# Patient Record
Sex: FEMALE | Race: BLACK OR AFRICAN AMERICAN | NOT HISPANIC OR LATINO | URBAN - METROPOLITAN AREA
[De-identification: names, ages, dates, MRNs, and addresses within clinical notes are randomized per-mention and may not be internally consistent; named-entity substitution may affect disease eponyms.]

---

## 2017-12-07 ENCOUNTER — EMERGENCY (EMERGENCY)
Facility: HOSPITAL | Age: 52
LOS: 0 days | Discharge: HOME | End: 2017-12-07
Attending: EMERGENCY MEDICINE
Payer: MEDICAID

## 2017-12-07 VITALS
WEIGHT: 149.91 LBS | SYSTOLIC BLOOD PRESSURE: 152 MMHG | RESPIRATION RATE: 19 BRPM | HEART RATE: 59 BPM | HEIGHT: 60 IN | TEMPERATURE: 98 F | DIASTOLIC BLOOD PRESSURE: 82 MMHG

## 2017-12-07 VITALS
OXYGEN SATURATION: 98 % | HEART RATE: 67 BPM | TEMPERATURE: 98 F | RESPIRATION RATE: 18 BRPM | SYSTOLIC BLOOD PRESSURE: 148 MMHG | DIASTOLIC BLOOD PRESSURE: 79 MMHG

## 2017-12-07 LAB
ALBUMIN SERPL ELPH-MCNC: 3.5 G/DL — SIGNIFICANT CHANGE UP (ref 3.3–5)
ALP SERPL-CCNC: 116 U/L — SIGNIFICANT CHANGE UP (ref 40–120)
ALT FLD-CCNC: 35 U/L — SIGNIFICANT CHANGE UP (ref 12–78)
ANION GAP SERPL CALC-SCNC: 8 MMOL/L — SIGNIFICANT CHANGE UP (ref 5–17)
APPEARANCE UR: CLEAR — SIGNIFICANT CHANGE UP
APTT BLD: 32 SEC — SIGNIFICANT CHANGE UP (ref 27.5–37.4)
AST SERPL-CCNC: 18 U/L — SIGNIFICANT CHANGE UP (ref 15–37)
BACTERIA # UR AUTO: ABNORMAL
BASOPHILS # BLD AUTO: 0.1 K/UL — SIGNIFICANT CHANGE UP (ref 0–0.2)
BASOPHILS NFR BLD AUTO: 1.1 % — SIGNIFICANT CHANGE UP (ref 0–2)
BILIRUB SERPL-MCNC: 0.2 MG/DL — SIGNIFICANT CHANGE UP (ref 0.2–1.2)
BILIRUB UR-MCNC: NEGATIVE — SIGNIFICANT CHANGE UP
BUN SERPL-MCNC: 19 MG/DL — SIGNIFICANT CHANGE UP (ref 7–23)
CALCIUM SERPL-MCNC: 8.6 MG/DL — SIGNIFICANT CHANGE UP (ref 8.5–10.1)
CHLORIDE SERPL-SCNC: 108 MMOL/L — SIGNIFICANT CHANGE UP (ref 96–108)
CK MB BLD-MCNC: 2.9 % — SIGNIFICANT CHANGE UP (ref 0–3.5)
CK MB CFR SERPL CALC: 4.9 NG/ML — HIGH (ref 0.5–3.6)
CK SERPL-CCNC: 171 U/L — SIGNIFICANT CHANGE UP (ref 26–192)
CO2 SERPL-SCNC: 27 MMOL/L — SIGNIFICANT CHANGE UP (ref 22–31)
COD CRY URNS QL: ABNORMAL
COLOR SPEC: YELLOW — SIGNIFICANT CHANGE UP
CREAT SERPL-MCNC: 1.04 MG/DL — SIGNIFICANT CHANGE UP (ref 0.5–1.3)
D DIMER BLD IA.RAPID-MCNC: 166 NG/ML DDU — SIGNIFICANT CHANGE UP
DIFF PNL FLD: ABNORMAL
EOSINOPHIL # BLD AUTO: 0.1 K/UL — SIGNIFICANT CHANGE UP (ref 0–0.5)
EOSINOPHIL NFR BLD AUTO: 1.4 % — SIGNIFICANT CHANGE UP (ref 0–6)
EPI CELLS # UR: SIGNIFICANT CHANGE UP
GLUCOSE SERPL-MCNC: 87 MG/DL — SIGNIFICANT CHANGE UP (ref 70–99)
GLUCOSE UR QL: NEGATIVE MG/DL — SIGNIFICANT CHANGE UP
HCT VFR BLD CALC: 42.8 % — SIGNIFICANT CHANGE UP (ref 34.5–45)
HGB BLD-MCNC: 13.8 G/DL — SIGNIFICANT CHANGE UP (ref 11.5–15.5)
INR BLD: 1.03 RATIO — SIGNIFICANT CHANGE UP (ref 0.88–1.16)
KETONES UR-MCNC: NEGATIVE — SIGNIFICANT CHANGE UP
LEUKOCYTE ESTERASE UR-ACNC: ABNORMAL
LYMPHOCYTES # BLD AUTO: 4.7 K/UL — HIGH (ref 1–3.3)
LYMPHOCYTES # BLD AUTO: 43.8 % — SIGNIFICANT CHANGE UP (ref 13–44)
MCHC RBC-ENTMCNC: 30.7 PG — SIGNIFICANT CHANGE UP (ref 27–34)
MCHC RBC-ENTMCNC: 32.2 GM/DL — SIGNIFICANT CHANGE UP (ref 32–36)
MCV RBC AUTO: 95.5 FL — SIGNIFICANT CHANGE UP (ref 80–100)
MONOCYTES # BLD AUTO: 0.5 K/UL — SIGNIFICANT CHANGE UP (ref 0–0.9)
MONOCYTES NFR BLD AUTO: 5.1 % — SIGNIFICANT CHANGE UP (ref 2–14)
NEUTROPHILS # BLD AUTO: 5.2 K/UL — SIGNIFICANT CHANGE UP (ref 1.8–7.4)
NEUTROPHILS NFR BLD AUTO: 48.6 % — SIGNIFICANT CHANGE UP (ref 43–77)
NITRITE UR-MCNC: NEGATIVE — SIGNIFICANT CHANGE UP
PH UR: 6 — SIGNIFICANT CHANGE UP (ref 5–8)
PLATELET # BLD AUTO: 282 K/UL — SIGNIFICANT CHANGE UP (ref 150–400)
POTASSIUM SERPL-MCNC: 3.5 MMOL/L — SIGNIFICANT CHANGE UP (ref 3.5–5.3)
POTASSIUM SERPL-SCNC: 3.5 MMOL/L — SIGNIFICANT CHANGE UP (ref 3.5–5.3)
PROT SERPL-MCNC: 7.4 GM/DL — SIGNIFICANT CHANGE UP (ref 6–8.3)
PROT UR-MCNC: NEGATIVE MG/DL — SIGNIFICANT CHANGE UP
PROTHROM AB SERPL-ACNC: 11.2 SEC — SIGNIFICANT CHANGE UP (ref 9.8–12.7)
RBC # BLD: 4.49 M/UL — SIGNIFICANT CHANGE UP (ref 3.8–5.2)
RBC # FLD: 11.8 % — SIGNIFICANT CHANGE UP (ref 11–15)
RBC CASTS # UR COMP ASSIST: ABNORMAL /HPF (ref 0–4)
SODIUM SERPL-SCNC: 143 MMOL/L — SIGNIFICANT CHANGE UP (ref 135–145)
SP GR SPEC: 1.02 — SIGNIFICANT CHANGE UP (ref 1.01–1.02)
TROPONIN I SERPL-MCNC: <.015 NG/ML — SIGNIFICANT CHANGE UP (ref 0.01–0.04)
UROBILINOGEN FLD QL: 1 MG/DL
WBC # BLD: 10.6 K/UL — HIGH (ref 3.8–10.5)
WBC # FLD AUTO: 10.6 K/UL — HIGH (ref 3.8–10.5)
WBC UR QL: ABNORMAL

## 2017-12-07 PROCEDURE — 99285 EMERGENCY DEPT VISIT HI MDM: CPT

## 2017-12-07 PROCEDURE — 71020: CPT | Mod: 26

## 2017-12-07 PROCEDURE — 93010 ELECTROCARDIOGRAM REPORT: CPT

## 2017-12-07 PROCEDURE — 71275 CT ANGIOGRAPHY CHEST: CPT | Mod: 26

## 2017-12-07 RX ORDER — SODIUM CHLORIDE 9 MG/ML
3 INJECTION INTRAMUSCULAR; INTRAVENOUS; SUBCUTANEOUS ONCE
Qty: 0 | Refills: 0 | Status: COMPLETED | OUTPATIENT
Start: 2017-12-07 | End: 2017-12-07

## 2017-12-07 RX ORDER — NITROFURANTOIN MACROCRYSTAL 50 MG
100 CAPSULE ORAL ONCE
Qty: 0 | Refills: 0 | Status: COMPLETED | OUTPATIENT
Start: 2017-12-07 | End: 2017-12-07

## 2017-12-07 RX ORDER — FAMOTIDINE 10 MG/ML
20 INJECTION INTRAVENOUS ONCE
Qty: 0 | Refills: 0 | Status: COMPLETED | OUTPATIENT
Start: 2017-12-07 | End: 2017-12-07

## 2017-12-07 RX ORDER — IPRATROPIUM/ALBUTEROL SULFATE 18-103MCG
3 AEROSOL WITH ADAPTER (GRAM) INHALATION ONCE
Qty: 0 | Refills: 0 | Status: COMPLETED | OUTPATIENT
Start: 2017-12-07 | End: 2017-12-07

## 2017-12-07 RX ORDER — NITROFURANTOIN MACROCRYSTAL 50 MG
1 CAPSULE ORAL
Qty: 20 | Refills: 0 | OUTPATIENT
Start: 2017-12-07 | End: 2017-12-17

## 2017-12-07 RX ORDER — SODIUM CHLORIDE 9 MG/ML
2000 INJECTION INTRAMUSCULAR; INTRAVENOUS; SUBCUTANEOUS ONCE
Qty: 0 | Refills: 0 | Status: COMPLETED | OUTPATIENT
Start: 2017-12-07 | End: 2017-12-07

## 2017-12-07 RX ADMIN — SODIUM CHLORIDE 2000 MILLILITER(S): 9 INJECTION INTRAMUSCULAR; INTRAVENOUS; SUBCUTANEOUS at 19:07

## 2017-12-07 RX ADMIN — SODIUM CHLORIDE 3 MILLILITER(S): 9 INJECTION INTRAMUSCULAR; INTRAVENOUS; SUBCUTANEOUS at 18:51

## 2017-12-07 RX ADMIN — Medication 3 MILLILITER(S): at 19:06

## 2017-12-07 RX ADMIN — Medication 100 MILLIGRAM(S): at 23:03

## 2017-12-07 RX ADMIN — FAMOTIDINE 20 MILLIGRAM(S): 10 INJECTION INTRAVENOUS at 19:06

## 2017-12-07 NOTE — ED PROVIDER NOTE - PHYSICAL EXAMINATION
Gen: Alert, Well appearing. NAD    Head: NC, AT, PERRL, EOMI, normal lids/conjunctiva   ENT: Bilateral TM WNL, normal hearing, patent oropharynx without erythema/exudate, uvula midline. ++ nasal congestion.  Neck: supple, no tenderness/meningismus/JVD   Pulm: Bilateral clear BS, normal resp effort, no wheeze/stridor/retractions  CV: RRR, no M/R/G, +dist pulses   Abd: soft, NT/ND, +BS, no guarding/rebound tenderness  Mskel: no edema/erythema/cyanosis   Skin: no rash   Neuro: AAOx3, no sensory/motor deficits, CN 2-12 intact

## 2017-12-07 NOTE — ED ADULT NURSE NOTE - CHIEF COMPLAINT QUOTE
Pt c/o of chest tightness sin monday. Went [facility].  Pt stated that they did not work her up.Chest pain persist until now. D/c on Prednisone   and since has been taken medication, dizziness and difficulty urinating. Last time void was yesterday afternoon.  denies any fever ou coughing

## 2017-12-07 NOTE — ED ADULT NURSE NOTE - PMH
Acute pyelonephritis    Amenorrhea  started 15 years ago  Asthma    History of nephrolithiasis    Pituitary abnormality

## 2017-12-07 NOTE — ED PROVIDER NOTE - MEDICAL DECISION MAKING DETAILS
Pending UA, CT. Patient signed out to incoming physician.  All decisions regarding the progression of care will be made at their discretion.

## 2017-12-07 NOTE — ED PROVIDER NOTE - OBJECTIVE STATEMENT
53yo female with pmh asthma presents with 3 days of nasal congestion, epigastric/SS chest pain, and sob. Pt seen at [facility], given prednisone and dc with nasal congestion.  pt denies cough, fever. Denies history of cardiac dz. nonsmoker. no drug use. no famh of cardiac. Denies recent immobilization, surgery, long trips or plane rides, hormones/OCP, leg pain or swelling or family or personal history of blood clotting disorder. Pt reports menopausal x 20 years. Pt reports since she started prednisone, has gotten dizzy and few episodes of diarrhea and states difficulty with uop. denies abd pain. denies dysuria.     ROS: No fever/chills. No photophobia/eye pain/changes in vision, No ear pain/sore throat/dysphagia, + chest pain, no palpitations. +SOB, no cough/stridor. No abdominal pain, N/V, +D, no black/bloody bm. No dysuria/frequency/discharge, No headache. + Dizziness.  No rash.  No numbness/tingling/weakness.

## 2017-12-07 NOTE — ED ADULT NURSE NOTE - OBJECTIVE STATEMENT
patient c/o of chest pain and difficulty breathing started 2 days ago , patient c/o of diarrhea , unable to urinate and dizziness started yesterday, ashleigh stated she is taking Prednisone

## 2017-12-07 NOTE — ED ADULT TRIAGE NOTE - CHIEF COMPLAINT QUOTE
Pt c/o of chest tightness sin monday. Went [facility].  Pt stated that they did not work her up.Chest pain persist until now. D/c on Prednisone   and since has been taken medication, dizziness and difficulty urinating. Last time void was yesterday afternoon.  denies any fever ou coughing

## 2017-12-07 NOTE — ED PROVIDER NOTE - PROGRESS NOTE DETAILS
Pt endorsed by Dr. Shoemaker present for eval of SOB.  Pending CTA. CTA neg for PE, no PNA.  +UTI.  Discussed results and outcome of testing with the patient, given copy as well.  Patient advised to please follow up with their primary care doctor within the next 24 hours and return to the Emergency Department for worsening symptoms or any other concerns.  Patient advised that their doctor may call  to follow up on the specific results of the tests performed today in the emergency department.

## 2017-12-08 DIAGNOSIS — R07.9 CHEST PAIN, UNSPECIFIED: ICD-10-CM

## 2017-12-08 DIAGNOSIS — Z88.8 ALLERGY STATUS TO OTHER DRUGS, MEDICAMENTS AND BIOLOGICAL SUBSTANCES STATUS: ICD-10-CM

## 2017-12-08 DIAGNOSIS — R10.13 EPIGASTRIC PAIN: ICD-10-CM

## 2017-12-08 DIAGNOSIS — R09.81 NASAL CONGESTION: ICD-10-CM

## 2017-12-08 DIAGNOSIS — N10 ACUTE PYELONEPHRITIS: ICD-10-CM

## 2017-12-08 DIAGNOSIS — J45.909 UNSPECIFIED ASTHMA, UNCOMPLICATED: ICD-10-CM

## 2017-12-08 DIAGNOSIS — N91.2 AMENORRHEA, UNSPECIFIED: ICD-10-CM

## 2017-12-08 DIAGNOSIS — N39.0 URINARY TRACT INFECTION, SITE NOT SPECIFIED: ICD-10-CM

## 2017-12-08 LAB
CULTURE RESULTS: SIGNIFICANT CHANGE UP
SPECIMEN SOURCE: SIGNIFICANT CHANGE UP

## 2018-01-22 ENCOUNTER — EMERGENCY (EMERGENCY)
Facility: HOSPITAL | Age: 53
LOS: 0 days | Discharge: HOME | End: 2018-01-22
Attending: EMERGENCY MEDICINE
Payer: MEDICAID

## 2018-01-22 VITALS
RESPIRATION RATE: 21 BRPM | SYSTOLIC BLOOD PRESSURE: 127 MMHG | WEIGHT: 149.91 LBS | OXYGEN SATURATION: 97 % | HEIGHT: 60 IN | TEMPERATURE: 98 F | HEART RATE: 82 BPM | DIASTOLIC BLOOD PRESSURE: 49 MMHG

## 2018-01-22 VITALS
SYSTOLIC BLOOD PRESSURE: 123 MMHG | OXYGEN SATURATION: 98 % | TEMPERATURE: 99 F | DIASTOLIC BLOOD PRESSURE: 73 MMHG | HEART RATE: 77 BPM | RESPIRATION RATE: 16 BRPM

## 2018-01-22 PROCEDURE — 99284 EMERGENCY DEPT VISIT MOD MDM: CPT

## 2018-01-22 RX ORDER — DIPHENHYDRAMINE HCL 50 MG
50 CAPSULE ORAL ONCE
Qty: 0 | Refills: 0 | Status: COMPLETED | OUTPATIENT
Start: 2018-01-22 | End: 2018-01-22

## 2018-01-22 RX ORDER — DIPHENHYDRAMINE HCL 50 MG
1 CAPSULE ORAL
Qty: 15 | Refills: 0
Start: 2018-01-22 | End: 2018-01-26

## 2018-01-22 RX ORDER — EPINEPHRINE 0.3 MG/.3ML
0.3 INJECTION INTRAMUSCULAR; SUBCUTANEOUS
Qty: 1 | Refills: 0
Start: 2018-01-22 | End: 2018-01-22

## 2018-01-22 RX ORDER — FAMOTIDINE 10 MG/ML
20 INJECTION INTRAVENOUS ONCE
Qty: 0 | Refills: 0 | Status: COMPLETED | OUTPATIENT
Start: 2018-01-22 | End: 2018-01-22

## 2018-01-22 RX ORDER — FAMOTIDINE 10 MG/ML
1 INJECTION INTRAVENOUS
Qty: 10 | Refills: 0
Start: 2018-01-22 | End: 2018-01-26

## 2018-01-22 RX ADMIN — Medication 50 MILLIGRAM(S): at 06:48

## 2018-01-22 RX ADMIN — FAMOTIDINE 20 MILLIGRAM(S): 10 INJECTION INTRAVENOUS at 06:49

## 2018-01-22 RX ADMIN — Medication 125 MILLIGRAM(S): at 06:48

## 2018-01-22 NOTE — ED ADULT NURSE NOTE - PSH
Ankle fracture  S/p ORIF 1n   Ankle fracture, left  S/p ORIF in 2011   delivery delivered  1984  Renal calculus, right  right renal calculi removal ,  S/P abdominoplasty  .

## 2018-01-22 NOTE — ED PROVIDER NOTE - OBJECTIVE STATEMENT
Pt is a 51 yo lady with a pmhx of asthma who presents to the ED with an allergic reaction. Has had hives on her elbow and neck for the past week, but last night it was worse. Has been taking benadryl intermittently, which controls the hives, but they return. No throat swelling. No hx of allergic reactions in the past. 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 No new medications, does not know what may be causing the hives. This reaction started again at 4 AM, worse on thighs now.

## 2018-01-22 NOTE — ED ADULT NURSE NOTE - OBJECTIVE STATEMENT
Pt c/o generalized pruritis & rash for a few days increasing in itchiness and red papules noticed last night. Pt denies trying any new food/detergent. Reports recently having a hair weave place this week which she took out yesterday due to increase pruritis. Denies trouble breathing. Denies trouble swallowing. A&Ox4. No distress noted. Able to speak in full sentences. Generalized red papules noted

## 2018-01-22 NOTE — ED ADULT NURSE NOTE - CAS EDN DISCHARGE ASSESSMENT
Alert and oriented to person, place and time/Dressing clean and dry/Awake/No adverse reaction to first time med in ED/Symptoms improved

## 2018-01-22 NOTE — ED PROVIDER NOTE - PROGRESS NOTE DETAILS
Pt. reports feeling better after meds, rash fading, pt. denies knowing what caused reaction  pt. agrees to f/u with primary care outpt.  pt. understands to return to ED if symptoms worsen; will d/c with meds

## 2018-01-23 DIAGNOSIS — L23.9 ALLERGIC CONTACT DERMATITIS, UNSPECIFIED CAUSE: ICD-10-CM

## 2018-01-23 DIAGNOSIS — L50.9 URTICARIA, UNSPECIFIED: ICD-10-CM

## 2018-01-23 DIAGNOSIS — Z88.1 ALLERGY STATUS TO OTHER ANTIBIOTIC AGENTS STATUS: ICD-10-CM

## 2018-01-23 DIAGNOSIS — J45.909 UNSPECIFIED ASTHMA, UNCOMPLICATED: ICD-10-CM

## 2018-01-30 ENCOUNTER — EMERGENCY (EMERGENCY)
Facility: HOSPITAL | Age: 53
LOS: 0 days | Discharge: HOME | End: 2018-01-30
Attending: EMERGENCY MEDICINE
Payer: MEDICAID

## 2018-01-30 VITALS
HEART RATE: 85 BPM | TEMPERATURE: 99 F | RESPIRATION RATE: 20 BRPM | DIASTOLIC BLOOD PRESSURE: 79 MMHG | SYSTOLIC BLOOD PRESSURE: 151 MMHG | OXYGEN SATURATION: 99 % | HEIGHT: 68 IN | WEIGHT: 149.91 LBS

## 2018-01-30 PROCEDURE — 99283 EMERGENCY DEPT VISIT LOW MDM: CPT

## 2018-01-30 RX ADMIN — Medication 30 MILLILITER(S): at 19:11

## 2018-01-30 RX ADMIN — Medication 60 MILLIGRAM(S): at 19:10

## 2018-01-30 NOTE — ED ADULT NURSE NOTE - PMH
Acute pyelonephritis    Amenorrhea  started 15 years ago  Asthma    Gastritis    History of nephrolithiasis    Pituitary abnormality

## 2018-01-30 NOTE — ED PROVIDER NOTE - OBJECTIVE STATEMENT
Patient has had hives for several days despite taking corticosteroids and antihistamines; patient admits symptoms started after using new  for clothes

## 2018-01-30 NOTE — ED ADULT TRIAGE NOTE - CHIEF COMPLAINT QUOTE
pt states, " I was here for hives last week, my prednisone is finished and I am breaking out in hives now "

## 2018-01-30 NOTE — ED PROVIDER NOTE - MEDICAL DECISION MAKING DETAILS
patient advised to not use the  and see if there is any improvement; patient has allergist appoint in next several days

## 2018-01-30 NOTE — ED ADULT NURSE NOTE - OBJECTIVE STATEMENT
received ft c/o generalized hives noted x 1 week to arms, hands, and body stays slight throat discomfort earlier in week seen at [facility] on monday for chest tightness, asthma exacerbation associated with hives with epi pen prednisone and claritin rx'd no swelling noted to face, lips at present lungs clear b/l nasal congestion noted denies difficulty breathing at present states hx asthma, used inhaler earlier this morning with relief states using laundromat service to wash towels and sheets

## 2018-01-31 DIAGNOSIS — E23.7 DISORDER OF PITUITARY GLAND, UNSPECIFIED: ICD-10-CM

## 2018-01-31 DIAGNOSIS — Z79.899 OTHER LONG TERM (CURRENT) DRUG THERAPY: ICD-10-CM

## 2018-01-31 DIAGNOSIS — J45.909 UNSPECIFIED ASTHMA, UNCOMPLICATED: ICD-10-CM

## 2018-01-31 DIAGNOSIS — Z79.2 LONG TERM (CURRENT) USE OF ANTIBIOTICS: ICD-10-CM

## 2018-01-31 DIAGNOSIS — L50.9 URTICARIA, UNSPECIFIED: ICD-10-CM

## 2018-01-31 DIAGNOSIS — R21 RASH AND OTHER NONSPECIFIC SKIN ERUPTION: ICD-10-CM

## 2018-03-01 NOTE — ED ADULT TRIAGE NOTE - NSWEIGHTCALCTOOLDRUG_GEN_A_CORE
Right UE Passive ROM was WNL (within normal limits)/Right UE Active ROM was WNL (within normal limits)  used

## 2018-05-19 ENCOUNTER — EMERGENCY (EMERGENCY)
Facility: HOSPITAL | Age: 53
LOS: 0 days | Discharge: HOME | End: 2018-05-20
Attending: EMERGENCY MEDICINE
Payer: MEDICAID

## 2018-05-19 VITALS
SYSTOLIC BLOOD PRESSURE: 122 MMHG | HEIGHT: 60 IN | TEMPERATURE: 98 F | OXYGEN SATURATION: 98 % | RESPIRATION RATE: 19 BRPM | WEIGHT: 149.91 LBS | DIASTOLIC BLOOD PRESSURE: 59 MMHG | HEART RATE: 70 BPM

## 2018-05-19 DIAGNOSIS — Z79.2 LONG TERM (CURRENT) USE OF ANTIBIOTICS: ICD-10-CM

## 2018-05-19 DIAGNOSIS — D35.2 BENIGN NEOPLASM OF PITUITARY GLAND: ICD-10-CM

## 2018-05-19 DIAGNOSIS — M79.1 MYALGIA: ICD-10-CM

## 2018-05-19 DIAGNOSIS — R42 DIZZINESS AND GIDDINESS: ICD-10-CM

## 2018-05-19 DIAGNOSIS — R05 COUGH: ICD-10-CM

## 2018-05-19 DIAGNOSIS — J20.9 ACUTE BRONCHITIS, UNSPECIFIED: ICD-10-CM

## 2018-05-19 LAB
ALBUMIN SERPL ELPH-MCNC: 3.7 G/DL — SIGNIFICANT CHANGE UP (ref 3.3–5)
ALP SERPL-CCNC: 108 U/L — SIGNIFICANT CHANGE UP (ref 40–120)
ALT FLD-CCNC: 49 U/L — SIGNIFICANT CHANGE UP (ref 12–78)
ANION GAP SERPL CALC-SCNC: 6 MMOL/L — SIGNIFICANT CHANGE UP (ref 5–17)
AST SERPL-CCNC: 53 U/L — HIGH (ref 15–37)
BILIRUB SERPL-MCNC: 0.4 MG/DL — SIGNIFICANT CHANGE UP (ref 0.2–1.2)
BUN SERPL-MCNC: 9 MG/DL — SIGNIFICANT CHANGE UP (ref 7–23)
CALCIUM SERPL-MCNC: 8.7 MG/DL — SIGNIFICANT CHANGE UP (ref 8.5–10.1)
CHLORIDE SERPL-SCNC: 107 MMOL/L — SIGNIFICANT CHANGE UP (ref 96–108)
CO2 SERPL-SCNC: 26 MMOL/L — SIGNIFICANT CHANGE UP (ref 22–31)
CREAT SERPL-MCNC: 0.77 MG/DL — SIGNIFICANT CHANGE UP (ref 0.5–1.3)
GLUCOSE SERPL-MCNC: 81 MG/DL — SIGNIFICANT CHANGE UP (ref 70–99)
HCT VFR BLD CALC: 41.4 % — SIGNIFICANT CHANGE UP (ref 34.5–45)
HGB BLD-MCNC: 13.8 G/DL — SIGNIFICANT CHANGE UP (ref 11.5–15.5)
MCHC RBC-ENTMCNC: 30.2 PG — SIGNIFICANT CHANGE UP (ref 27–34)
MCHC RBC-ENTMCNC: 33.3 GM/DL — SIGNIFICANT CHANGE UP (ref 32–36)
MCV RBC AUTO: 90.6 FL — SIGNIFICANT CHANGE UP (ref 80–100)
NRBC # BLD: 0 /100 WBCS — SIGNIFICANT CHANGE UP (ref 0–0)
PLATELET # BLD AUTO: 302 K/UL — SIGNIFICANT CHANGE UP (ref 150–400)
POTASSIUM SERPL-MCNC: 4.5 MMOL/L — SIGNIFICANT CHANGE UP (ref 3.5–5.3)
POTASSIUM SERPL-SCNC: 4.5 MMOL/L — SIGNIFICANT CHANGE UP (ref 3.5–5.3)
PROT SERPL-MCNC: 7.7 GM/DL — SIGNIFICANT CHANGE UP (ref 6–8.3)
RBC # BLD: 4.57 M/UL — SIGNIFICANT CHANGE UP (ref 3.8–5.2)
RBC # FLD: 12 % — SIGNIFICANT CHANGE UP (ref 10.3–14.5)
SODIUM SERPL-SCNC: 139 MMOL/L — SIGNIFICANT CHANGE UP (ref 135–145)
WBC # BLD: 7.86 K/UL — SIGNIFICANT CHANGE UP (ref 3.8–10.5)
WBC # FLD AUTO: 7.86 K/UL — SIGNIFICANT CHANGE UP (ref 3.8–10.5)

## 2018-05-19 PROCEDURE — 99284 EMERGENCY DEPT VISIT MOD MDM: CPT

## 2018-05-19 PROCEDURE — 70450 CT HEAD/BRAIN W/O DYE: CPT | Mod: 26

## 2018-05-19 PROCEDURE — 70490 CT SOFT TISSUE NECK W/O DYE: CPT | Mod: 26

## 2018-05-19 PROCEDURE — 71046 X-RAY EXAM CHEST 2 VIEWS: CPT | Mod: 26

## 2018-05-19 RX ORDER — KETOROLAC TROMETHAMINE 30 MG/ML
30 SYRINGE (ML) INJECTION ONCE
Qty: 0 | Refills: 0 | Status: DISCONTINUED | OUTPATIENT
Start: 2018-05-19 | End: 2018-05-19

## 2018-05-19 RX ORDER — DEXAMETHASONE 0.5 MG/5ML
10 ELIXIR ORAL ONCE
Qty: 0 | Refills: 0 | Status: COMPLETED | OUTPATIENT
Start: 2018-05-19 | End: 2018-05-19

## 2018-05-19 RX ORDER — MECLIZINE HCL 12.5 MG
1 TABLET ORAL
Qty: 15 | Refills: 0
Start: 2018-05-19

## 2018-05-19 RX ORDER — MECLIZINE HCL 12.5 MG
25 TABLET ORAL ONCE
Qty: 0 | Refills: 0 | Status: COMPLETED | OUTPATIENT
Start: 2018-05-19 | End: 2018-05-19

## 2018-05-19 RX ADMIN — Medication 10 MILLIGRAM(S): at 21:41

## 2018-05-19 RX ADMIN — Medication 25 MILLIGRAM(S): at 22:32

## 2018-05-19 RX ADMIN — Medication 30 MILLIGRAM(S): at 21:38

## 2018-05-19 RX ADMIN — Medication 30 MILLIGRAM(S): at 22:32

## 2018-05-19 NOTE — ED ADULT TRIAGE NOTE - CHIEF COMPLAINT QUOTE
" I have a sinus infection and I have a head cold and I was feeling really sick, I felt like I had the flu and then I used my ashtma pump and it make me feel bad and I have a tumor on my pituitary gland and I am taking a lot of meds and I was feeling dizzy and vertigo"

## 2018-05-19 NOTE — ED PROVIDER NOTE - ATTENDING CONTRIBUTION TO CARE
labs and imaging reviewed. patient received decadron and toradol. patient currently feels well and wants to go home. patient discharged with care instructions. patient is to follow up with pmd. Discussed results and outcome of testing with the patient.  Patient advised to please follow up with their primary care doctor within the next 24 hours and return to the Emergency Department for worsening symptoms or any other concerns.  Patient advised that their doctor may call  to follow up on the specific results of the tests performed today in the emergency department.    Attending Physician: I have personally seen and examined the patient. I agree with the above history, physical and plan which I have reviewed and edited as appropriate.

## 2018-05-19 NOTE — ED PROVIDER NOTE - OBJECTIVE STATEMENT
this is a 44 yo F w a pmhx of pituitary tumor not on chemo or radiation,  c/o of body ache,  right side rib pain, cough, chest tightness, swollen neck x few days. Denies sob, chest pan, recent travel. Pt sts she is currently seeing an endocrinologist that waiting to do a mri of head but is unable bc of patient allergy to dye. she taken albuterol and allergan for her symptoms w mild to no relief.

## 2018-05-20 VITALS
DIASTOLIC BLOOD PRESSURE: 64 MMHG | OXYGEN SATURATION: 100 % | HEART RATE: 74 BPM | RESPIRATION RATE: 18 BRPM | TEMPERATURE: 98 F | SYSTOLIC BLOOD PRESSURE: 120 MMHG

## 2018-05-20 RX ORDER — ALBUTEROL 90 UG/1
2 AEROSOL, METERED ORAL
Qty: 8.5 | Refills: 0
Start: 2018-05-20 | End: 2018-06-18

## 2018-11-27 ENCOUNTER — EMERGENCY (EMERGENCY)
Facility: HOSPITAL | Age: 53
LOS: 0 days | Discharge: HOME | End: 2018-11-27
Attending: EMERGENCY MEDICINE
Payer: MEDICAID

## 2018-11-27 VITALS
HEART RATE: 98 BPM | RESPIRATION RATE: 18 BRPM | DIASTOLIC BLOOD PRESSURE: 89 MMHG | OXYGEN SATURATION: 98 % | SYSTOLIC BLOOD PRESSURE: 125 MMHG | WEIGHT: 149.03 LBS | TEMPERATURE: 98 F | HEIGHT: 60 IN

## 2018-11-27 VITALS
DIASTOLIC BLOOD PRESSURE: 79 MMHG | TEMPERATURE: 98 F | OXYGEN SATURATION: 99 % | HEART RATE: 85 BPM | SYSTOLIC BLOOD PRESSURE: 128 MMHG | RESPIRATION RATE: 19 BRPM

## 2018-11-27 DIAGNOSIS — L50.9 URTICARIA, UNSPECIFIED: ICD-10-CM
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
## 2018-11-27 DIAGNOSIS — T78.40XD ALLERGY, UNSPECIFIED, SUBSEQUENT ENCOUNTER: ICD-10-CM

## 2018-11-27 DIAGNOSIS — Z79.52 LONG TERM (CURRENT) USE OF SYSTEMIC STEROIDS: ICD-10-CM

## 2018-11-27 DIAGNOSIS — J45.909 UNSPECIFIED ASTHMA, UNCOMPLICATED: ICD-10-CM

## 2018-11-27 PROBLEM — K29.70 GASTRITIS, UNSPECIFIED, WITHOUT BLEEDING: Chronic | Status: ACTIVE | Noted: 2018-01-30

## 2018-11-27 PROCEDURE — 99284 EMERGENCY DEPT VISIT MOD MDM: CPT | Mod: 25

## 2018-11-27 RX ORDER — FAMOTIDINE 10 MG/ML
1 INJECTION INTRAVENOUS
Qty: 5 | Refills: 0
Start: 2018-11-27 | End: 2018-12-01

## 2018-11-27 RX ORDER — FAMOTIDINE 10 MG/ML
20 INJECTION INTRAVENOUS ONCE
Qty: 0 | Refills: 0 | Status: COMPLETED | OUTPATIENT
Start: 2018-11-27 | End: 2018-11-27

## 2018-11-27 RX ORDER — DIPHENHYDRAMINE HCL 50 MG
50 CAPSULE ORAL ONCE
Qty: 0 | Refills: 0 | Status: COMPLETED | OUTPATIENT
Start: 2018-11-27 | End: 2018-11-27

## 2018-11-27 RX ORDER — EPINEPHRINE 0.3 MG/.3ML
0.3 INJECTION INTRAMUSCULAR; SUBCUTANEOUS
Qty: 2 | Refills: 0
Start: 2018-11-27

## 2018-11-27 RX ADMIN — Medication 50 MILLIGRAM(S): at 02:36

## 2018-11-27 RX ADMIN — FAMOTIDINE 20 MILLIGRAM(S): 10 INJECTION INTRAVENOUS at 02:36

## 2018-11-27 NOTE — ED ADULT NURSE REASSESSMENT NOTE - NS ED NURSE REASSESS COMMENT FT1
pt verbalized relief from medications. Pt resting at this time, light dimmed. pt offered pillow blanket to sleep.

## 2018-11-27 NOTE — ED ADULT NURSE NOTE - OBJECTIVE STATEMENT
pt states pmh tumor on pituitary gland causing release of histamine. Pt c/o of hives, redness, swelling of right thigh radiating up right flank x2 days. Pain 10/10. pt took benadryl and prednisone cream without relief.

## 2018-11-27 NOTE — ED PROVIDER NOTE - OBJECTIVE STATEMENT
Pt is a 54 yo lady with a pmhx of recurrent allergic reactions who presents to the ED with an allergic reaction. This reaction started yesterday. She has hives along her thighs and buttocks and back. She denies any new exposures to medications or foods. Has had f/u w endocrinologist saying that she has a pituitary tumor that is secreting excess histamine. Pt denies any headache, any tongue swelling, any sob.

## 2019-12-20 ENCOUNTER — EMERGENCY (EMERGENCY)
Facility: HOSPITAL | Age: 54
LOS: 0 days | Discharge: HOME | End: 2019-12-20
Attending: EMERGENCY MEDICINE
Payer: MEDICAID

## 2019-12-20 VITALS
HEART RATE: 69 BPM | DIASTOLIC BLOOD PRESSURE: 81 MMHG | RESPIRATION RATE: 18 BRPM | OXYGEN SATURATION: 100 % | TEMPERATURE: 98 F | SYSTOLIC BLOOD PRESSURE: 123 MMHG

## 2019-12-20 VITALS
SYSTOLIC BLOOD PRESSURE: 144 MMHG | RESPIRATION RATE: 20 BRPM | DIASTOLIC BLOOD PRESSURE: 83 MMHG | HEART RATE: 77 BPM | HEIGHT: 60 IN | WEIGHT: 149.91 LBS | TEMPERATURE: 98 F | OXYGEN SATURATION: 100 %

## 2019-12-20 DIAGNOSIS — J45.909 UNSPECIFIED ASTHMA, UNCOMPLICATED: ICD-10-CM

## 2019-12-20 DIAGNOSIS — Z88.1 ALLERGY STATUS TO OTHER ANTIBIOTIC AGENTS STATUS: ICD-10-CM

## 2019-12-20 DIAGNOSIS — G43.909 MIGRAINE, UNSPECIFIED, NOT INTRACTABLE, WITHOUT STATUS MIGRAINOSUS: ICD-10-CM

## 2019-12-20 DIAGNOSIS — R51 HEADACHE: ICD-10-CM

## 2019-12-20 DIAGNOSIS — Z87.442 PERSONAL HISTORY OF URINARY CALCULI: ICD-10-CM

## 2019-12-20 PROCEDURE — 99284 EMERGENCY DEPT VISIT MOD MDM: CPT

## 2019-12-20 PROCEDURE — 70450 CT HEAD/BRAIN W/O DYE: CPT | Mod: 26

## 2019-12-20 RX ORDER — METOCLOPRAMIDE HCL 10 MG
10 TABLET ORAL ONCE
Refills: 0 | Status: COMPLETED | OUTPATIENT
Start: 2019-12-20 | End: 2019-12-20

## 2019-12-20 RX ORDER — SODIUM CHLORIDE 9 MG/ML
1000 INJECTION INTRAMUSCULAR; INTRAVENOUS; SUBCUTANEOUS ONCE
Refills: 0 | Status: COMPLETED | OUTPATIENT
Start: 2019-12-20 | End: 2019-12-20

## 2019-12-20 RX ORDER — KETOROLAC TROMETHAMINE 30 MG/ML
30 SYRINGE (ML) INJECTION ONCE
Refills: 0 | Status: DISCONTINUED | OUTPATIENT
Start: 2019-12-20 | End: 2019-12-20

## 2019-12-20 RX ORDER — ALBUTEROL 90 UG/1
2 AEROSOL, METERED ORAL
Qty: 0 | Refills: 0 | DISCHARGE

## 2019-12-20 RX ADMIN — Medication 10 MILLIGRAM(S): at 13:35

## 2019-12-20 RX ADMIN — Medication 30 MILLIGRAM(S): at 14:02

## 2019-12-20 RX ADMIN — SODIUM CHLORIDE 1000 MILLILITER(S): 9 INJECTION INTRAMUSCULAR; INTRAVENOUS; SUBCUTANEOUS at 13:34

## 2019-12-20 RX ADMIN — Medication 30 MILLIGRAM(S): at 13:34

## 2019-12-20 NOTE — ED ADULT NURSE NOTE - OBJECTIVE STATEMENT
pt A&Ox4 with c/o of right temporal pain and throbbing x2 days. alone with vertigo.  pt also with c/o nausea/ vomiting. pt states she had this for a year but did not have insurance to be seen. pt also reports having stiff neck and cough and runny nose. PMH pituitary tumor, Asthma

## 2019-12-20 NOTE — ED PROVIDER NOTE - NSFOLLOWUPCLINICS_GEN_ALL_ED_FT
[facility]  Neurology  [address] [place] [address] [address] [place] [address]  Phone: [phone]  Fax:   Follow Up Time:

## 2019-12-20 NOTE — ED PROVIDER NOTE - CLINICAL SUMMARY MEDICAL DECISION MAKING FREE TEXT BOX
CT findings reviewed with patient, symptoms improved, supportive care, PMD or clinic follow up recommended for reassessment. Patient is aware of signs/symptoms to return to the emergency department.

## 2019-12-20 NOTE — ED PROVIDER NOTE - PATIENT PORTAL LINK FT
You can access the FollowMyHealth Patient Portal offered by [facility] by registering at the following website: [email or web address]. By joining [employer]’s FollowMyHealth portal, you will also be able to view your health information using other applications (apps) compatible with our system.

## 2019-12-20 NOTE — ED ADULT TRIAGE NOTE - CHIEF COMPLAINT QUOTE
Pt states, " I HAVE HAD a bad headache for 2 days , I am nauseous, and I have a lump on right side of head , I am suppose to see a neurologist soon " pt took z jenny and steroids 1 week ago for URI

## 2020-01-30 ENCOUNTER — APPOINTMENT (OUTPATIENT)
Dept: CARDIOLOGY | Facility: CLINIC | Age: 55
End: 2020-01-30
Payer: MEDICAID

## 2020-01-30 VITALS — SYSTOLIC BLOOD PRESSURE: 146 MMHG | DIASTOLIC BLOOD PRESSURE: 84 MMHG

## 2020-01-30 VITALS
DIASTOLIC BLOOD PRESSURE: 94 MMHG | SYSTOLIC BLOOD PRESSURE: 144 MMHG | BODY MASS INDEX: 30.82 KG/M2 | HEART RATE: 72 BPM | OXYGEN SATURATION: 100 % | HEIGHT: 60 IN | WEIGHT: 157 LBS

## 2020-01-30 DIAGNOSIS — R94.31 ABNORMAL ELECTROCARDIOGRAM [ECG] [EKG]: ICD-10-CM

## 2020-01-30 DIAGNOSIS — Z78.9 OTHER SPECIFIED HEALTH STATUS: ICD-10-CM

## 2020-01-30 DIAGNOSIS — Z87.09 PERSONAL HISTORY OF OTHER DISEASES OF THE RESPIRATORY SYSTEM: ICD-10-CM

## 2020-01-30 PROCEDURE — 93306 TTE W/DOPPLER COMPLETE: CPT

## 2020-01-30 PROCEDURE — 99204 OFFICE O/P NEW MOD 45 MIN: CPT

## 2020-01-30 RX ORDER — BECLOMETHASONE DIPROPIONATE 80 UG/1
AEROSOL, METERED RESPIRATORY (INHALATION) TWICE DAILY
Refills: 0 | Status: ACTIVE | COMMUNITY

## 2020-01-30 RX ORDER — CETIRIZINE HCL 10 MG
TABLET ORAL
Refills: 0 | Status: ACTIVE | COMMUNITY

## 2020-01-30 RX ORDER — FEXOFENADINE HCL 180 MG
180 TABLET ORAL
Refills: 0 | Status: ACTIVE | COMMUNITY

## 2020-01-30 NOTE — HISTORY OF PRESENT ILLNESS
[FreeTextEntry1] : Abimbola Ruiz is a 54 year old female with no significant medical history comes for cardiac evaluation. EKG done at PCP office showed RBBB. Has history of year around allergies. Works with chemicals. Complaining of 2 day history of fluttering in the chest with mild chest tightness, non radiating lasts for few minutes on and off lasted whole night. Also felt shortness of breath for last couple of days. No exertional chest pains. Seen by Allergy specialist and changed medications.

## 2020-01-30 NOTE — REASON FOR VISIT
[Abnormal ECG] : an abnormal ECG [Consultation] : a consultation regarding [Chest Pain] : chest pain [FreeTextEntry1] : shortness of breath.

## 2020-01-30 NOTE — REVIEW OF SYSTEMS
[Blurry Vision] : blurred vision [Shortness Of Breath] : shortness of breath [Chest Pain] : chest pain [Dizziness] : dizziness [Palpitations] : palpitations [Negative] : Endocrine [Eye Pain] : no eye pain [Seeing Double (Diplopia)] : no diplopia [Lower Ext Edema] : no extremity edema [Eyeglasses] : not currently wearing eyeglasses [Tremor] : no tremor was seen [Numbness (Hypesthesia)] : no numbness [Convulsions] : no convulsions [Tingling (Paresthesia)] : no tingling [Easy Bruising] : no tendency for easy bruising [Easy Bleeding] : no tendency for easy bleeding

## 2020-01-30 NOTE — DISCUSSION/SUMMARY
[FreeTextEntry1] : In a summary Abimbola Ruiz is a middle aged female with Abnormal EKG showing RBBB, new versus old. Fluttering and chest tightness may be secondary to Allergies. However Echo done showed normal LV systolic function.Shortness of breath may be secondary to Asthma. However Abnormal EKG, chest tightness and shortness breath will get stress echo to rule out ischemia. Further plan based on stress test results.

## 2020-01-30 NOTE — PHYSICAL EXAM
[General Appearance - Well Developed] : well developed [Normal Appearance] : normal appearance [General Appearance - Well Nourished] : well nourished [Well Groomed] : well groomed [No Deformities] : no deformities [General Appearance - In No Acute Distress] : no acute distress [Normal Conjunctiva] : the conjunctiva exhibited no abnormalities [Eyelids - No Xanthelasma] : the eyelids demonstrated no xanthelasmas [No Oral Pallor] : no oral pallor [Normal Oral Mucosa] : normal oral mucosa [No Oral Cyanosis] : no oral cyanosis [Heart Rate And Rhythm] : heart rate and rhythm were normal [Heart Sounds] : normal S1 and S2 [Arterial Pulses Normal] : the arterial pulses were normal [Murmurs] : no murmurs present [Edema] : no peripheral edema present [Bowel Sounds] : normal bowel sounds [Respiration, Rhythm And Depth] : normal respiratory rhythm and effort [Auscultation Breath Sounds / Voice Sounds] : lungs were clear to auscultation bilaterally [Abdomen Soft] : soft [Abdomen Tenderness] : non-tender [Abnormal Walk] : normal gait [Nail Clubbing] : no clubbing of the fingernails [Skin Color & Pigmentation] : normal skin color and pigmentation [Cyanosis, Localized] : no localized cyanosis [] : no rash [Skin Turgor] : normal skin turgor [No Anxiety] : not feeling anxious [Oriented To Time, Place, And Person] : oriented to person, place, and time [Impaired Insight] : insight and judgment were intact [FreeTextEntry1] : No JVD

## 2020-02-10 ENCOUNTER — APPOINTMENT (OUTPATIENT)
Dept: CARDIOLOGY | Facility: CLINIC | Age: 55
End: 2020-02-10
Payer: MEDICAID

## 2020-02-10 PROCEDURE — 93351 STRESS TTE COMPLETE: CPT

## 2020-09-14 NOTE — ED PROVIDER NOTE - NORMAL, MLM
Nutrition Services: [facility] Referral  43 year old female with diagnosis of malignant neoplasm of lower inner quadrant of right breast.  Referral received for nutrition services. RD will attempt to see at next oncology appointment or will contact per policy for nutrition assessment.    Please contact as needed.  [phone]    
stormy all pertinent systems normal

## 2021-02-16 ENCOUNTER — NON-APPOINTMENT (OUTPATIENT)
Age: 56
End: 2021-02-16

## 2021-02-16 ENCOUNTER — APPOINTMENT (OUTPATIENT)
Dept: CARDIOLOGY | Facility: CLINIC | Age: 56
End: 2021-02-16
Payer: MEDICAID

## 2021-02-16 VITALS
SYSTOLIC BLOOD PRESSURE: 118 MMHG | BODY MASS INDEX: 30.63 KG/M2 | TEMPERATURE: 97.8 F | HEIGHT: 60 IN | DIASTOLIC BLOOD PRESSURE: 76 MMHG | OXYGEN SATURATION: 98 % | WEIGHT: 156 LBS | HEART RATE: 65 BPM

## 2021-02-16 DIAGNOSIS — R07.89 OTHER CHEST PAIN: ICD-10-CM

## 2021-02-16 DIAGNOSIS — R06.02 SHORTNESS OF BREATH: ICD-10-CM

## 2021-02-16 DIAGNOSIS — I45.10 UNSPECIFIED RIGHT BUNDLE-BRANCH BLOCK: ICD-10-CM

## 2021-02-16 PROCEDURE — 99213 OFFICE O/P EST LOW 20 MIN: CPT

## 2021-02-16 PROCEDURE — 99072 ADDL SUPL MATRL&STAF TM PHE: CPT

## 2021-02-16 PROCEDURE — 93306 TTE W/DOPPLER COMPLETE: CPT

## 2021-02-16 PROCEDURE — 93000 ELECTROCARDIOGRAM COMPLETE: CPT

## 2021-02-16 NOTE — DISCUSSION/SUMMARY
exercise session detail [FreeTextEntry1] : In a summary Abimbola Ruiz is a middle aged female with Abnormal EKG showing sinus rhythm and RBBB which are old. On and off shortness of breath may be secondary to Asthma. However echo done showed normal LV systolic function and wall motion. Continue healthy lifestyle. Follow up in 1 year.

## 2021-02-16 NOTE — HISTORY OF PRESENT ILLNESS
[FreeTextEntry1] : Abimbola Ruiz is a 55 year old female with history of Asthma and abnormal EKG showing RBBB comes for follow up visit. Denies any chest pain or palpitations. Chronic on and off shortness of breath on exertion when she gets Asthma. Physically active as tolerated.

## 2021-02-16 NOTE — REVIEW OF SYSTEMS
[Blurry Vision] : blurred vision [Shortness Of Breath] : shortness of breath [Dizziness] : dizziness [Negative] : Endocrine [Seeing Double (Diplopia)] : no diplopia [Eye Pain] : no eye pain [Eyeglasses] : not currently wearing eyeglasses [Chest Pain] : no chest pain [Lower Ext Edema] : no extremity edema [Palpitations] : no palpitations [Tremor] : no tremor was seen [Numbness (Hypesthesia)] : no numbness [Convulsions] : no convulsions [Tingling (Paresthesia)] : no tingling [Easy Bleeding] : no tendency for easy bleeding [Easy Bruising] : no tendency for easy bruising

## 2022-01-01 NOTE — ED ADULT NURSE NOTE - ATTEMPT TO OOB
no CCHD Screen [12-10]: Initial  Pre-Ductal SpO2(%): 98  Post-Ductal SpO2(%): 98  SpO2 Difference(Pre MINUS Post): 0  Extremities Used: Right Hand,Right Foot  Result: Passed  Follow up: Normal Screen- (No follow-up needed)

## 2022-02-15 ENCOUNTER — APPOINTMENT (OUTPATIENT)
Dept: CARDIOLOGY | Facility: CLINIC | Age: 57
End: 2022-02-15

## 2022-03-15 ENCOUNTER — APPOINTMENT (OUTPATIENT)
Dept: CARDIOLOGY | Facility: CLINIC | Age: 57
End: 2022-03-15

## 2022-03-24 ENCOUNTER — APPOINTMENT (OUTPATIENT)
Dept: CARDIOLOGY | Facility: CLINIC | Age: 57
End: 2022-03-24

## 2022-05-05 NOTE — ED PROVIDER NOTE - CPE EDP CARDIAC NORM
Vascular Surgery Follow up        CHIEF COMPLAINT:  perpheral arterial disease, carotid disease    HISTORY OF PRESENT ILLNESS: Jesus Alberto Martinez is a 72 year old male who was well known to service history of chronic and active tobacco abuse, underwent recent CABG and also left carotid endarterectomy.  Post endarterectomy patient has left marginal mandibular nerve injury.  Per him the nerve injuries and slightly getting better.  He denies any strokes or mini strokes or any focal neurological deficits.  Is recovering well from his CABG.  Patient also has underlying perpheral arterial disease disease with chronic active tobacco abuse currently moderate distance claudication symptoms approximately 3 blocks.  Denies rest pain and tissue loss.  Has chronic numbness in the foot area right which is stable.  His recent able discussed and with the patient which essentially shows stable to slightly improved ABIs.  Since at this time patient denies rest pain tissue loss would recommend continue conservative/medical management.  However does develop any rest pain tissue loss then he will require right fem distal bypass.      PROBLEM LIST:    Patient Active Problem List   Diagnosis   • Severe peripheral arterial disease (CMS/[facility])   • Essential hypertension   • Hyperlipidemia LDL goal <70   • Cigarette nicotine dependence without complication   • Coronary artery disease involving native coronary artery of native heart with unstable angina pectoris (CMS/[facility])   • Hyponatremia   • Alcohol abuse   • Hyponatremia   • Other chest pain   • S/P LEFT carotid endarterectomy   • S/P CABG x 3   • Post-op AF   • High risk medication use - amiodarone   • Anticoagulated with Xarelto        PAST MEDICAL HISTORY:    Past Medical History:   Diagnosis Date   • Calcaneus fracture, left 04/1974   • Chronic pain    • Cigarette nicotine dependence without complication 2/12/2020   • COPD (chronic obstructive pulmonary disease) (CMS/[facility])    • Coronary artery  disease    • Essential (primary) hypertension    • Hyperlipidemia LDL goal <70 2/12/2020   • Myocardial infarction (CMS/[facility])    • S/P CABG x 3 1/26/2022   • Severe peripheral arterial disease (CMS/[facility]) 2/12/2020        PAST SURGICAL HISTORY:    Past Surgical History:   Procedure Laterality Date   • Coronary artery bypass graft     • Endarterectomy  04/16/2020   • Nasal septum surgery     • Open rx heel fracture Left 04/1974   • Rotator cuff repair Right 2010   • Vasectomy         CURRENT MEDICATIONS:   Current Outpatient Medications   Medication   • atorvastatin (LIPITOR) 80 MG tablet   • lisinopril (ZESTRIL) 20 MG tablet   • amLODIPine (NORVASC) 5 MG tablet   • rivaroxaban (Xarelto) 2.5 MG Tab   • dilTIAZem (Cardizem CD) 120 MG 24 hr capsule   • ferrous sulfate 325 (65 FE) MG tablet   • Calcium Carb-Cholecalciferol (Calcium 600+D3) 600-400 MG-UNIT Tab   • aspirin 81 MG tablet   • gabapentin (Neurontin) 100 MG capsule   • nicotine (NICODERM) 7 MG/24HR patch     No current facility-administered medications for this visit.       HOME MEDICATIONS:  Prior to Admission medications    Medication Sig Start Date End Date Taking? Authorizing Provider   atorvastatin (LIPITOR) 80 MG tablet Take 1 tablet by mouth nightly. 4/1/22  Yes Chas Buenrostro,    lisinopril (ZESTRIL) 20 MG tablet Take 1 tablet by mouth daily. 4/1/22  Yes Chas Buenrostro,    amLODIPine (NORVASC) 5 MG tablet Take 1 tablet by mouth daily. 4/1/22  Yes Chas Buenrostro DO   rivaroxaban (Xarelto) 2.5 MG Tab Take 1 tablet by mouth 2 times daily. 4/1/22  Yes Maxine Lincoln MD   dilTIAZem (Cardizem CD) 120 MG 24 hr capsule Take 1 capsule by mouth daily. 3/31/22  Yes Moustapha Hazel MD   ferrous sulfate 325 (65 FE) MG tablet Take 1 tablet by mouth 3 times daily (with meals). 11/3/21  Yes ISELA Curiel   Calcium Carb-Cholecalciferol (Calcium 600+D3) 600-400 MG-UNIT Tab Take 1 tablet by mouth daily.   Yes Outside Provider   aspirin 81 MG tablet Take 81 mg  by mouth daily.   Yes Outside Provider   gabapentin (Neurontin) 100 MG capsule Take 1 capsule by mouth nightly as needed (Leg Pain). Take 1 tablet every bedtime 10/27/21   ISELA Curiel   amoxicillin (AMOXIL) 500 MG capsule Four capsules one hour prior to dental work. 10/27/21 5/5/22  ISELA Curiel   nicotine (NICODERM) 7 MG/24HR patch Place 1 patch onto the skin every 24 hours.  Patient taking differently: Place 1 patch onto the skin every 24 hours as needed.  7/21/20   ISELA Nuegnt         ALLERGIES:  ALLERGIES:  No Known Allergies     SOCIAL HISTORY:   Social History     Tobacco Use   • Smoking status: Current Some Day Smoker     Packs/day: 0.50     Years: 30.00     Pack years: 15.00     Types: Cigarettes     Start date: 1956   • Smokeless tobacco: Never Used   Substance Use Topics   • Alcohol use: Yes     Alcohol/week: 60.0 standard drinks     Types: 60 Cans of beer per week     Comment: daily, 4-6 beers       FAMILY HISTORY:   Family History   Problem Relation Age of Onset   • Cancer, Breast Mother    • Coronary Artery Disease Mother    • Hyperlipidemia Father    • Pacemaker Sister    • Coronary Artery Disease Brother    • Other Brother         [place]       PHYSICAL EXAM:  Visit Vitals  /64 (BP Location: RUE - Right upper extremity, Patient Position: Sitting, Cuff Size: Regular)   Pulse 85   Wt 61.7 kg (136 lb)   SpO2 98%   BMI 21.95 kg/m²     Constitutional:  Well developed, well nourished, no acute distress, nontoxic appearance, able to get onto the examining table without assistance   Neck:  No mass, left neck incision healed well  Lungs:  No respiratory distress, normal breath sounds, no rales, no wheezing   Heart:  Normal rate, normal rhythm, no murmur  Abdomen:  Soft, nondistended, normal bowel sounds, nontender, no organomegaly, no mass, no rebound, no guarding   CVS/Extremities:  No edema, no tenderness, no deformities  Monophasic Doppler signals bilaterally, no ulceration  gangrene, dorsiflexion plantar flexion intact equal bilateral  Skin:  Intact, healthy appearing, normal color   Lymphatic:  No lymphadenopathy noted   Neurology Cn 2-12 intact, UE/LE power 5/5 equal and bilateral  Left marginal mandibular nerve injury    Review of Systems:  Constitutional:  Denies fever or chills.  Weight stable.   Eyes:  Denies change in visual acuity or diplopia.   HENT:  Denies nasal congestion, sore throat, or change of hearing   Respiratory:  Denies cough or shortness of breath   Cardiovascular:  Denies chest pain, edema, palpitations, or orthopnea    Gastrointestinal:  Denies abdominal pain, nausea, vomiting, bloody stools or diarrhea   Genitourinary:  Denies dysuria, frequency, or urgency   Musculoskeletal:  Denies back pain, joint pain or joint swelling   Integument:  Denies rash or changing skin lesions   Neurologic:  Denies headache, focal weakness or sensory changes          LABS:  No results found for this or any previous visit (from the past 24 hour(s)).    IMAGING:   US Ankle/Brachial 1 or 2 Level Extremity    Result Date: 4/28/2022  Narrative: Procedure performed: Ultrasound ankle//brachial 1 or 2 level extremity bilateral ankle-brachial index Exam: Ultrasound ankle/brachial 1 or 2 level extremity bilateral Indications: Evaluate for peripheral vascular disease in patient with possible claudication Technique: Bilateral arm ankle and digital pressures were obtained. Doppler tracings obtained and ankle and pulse following recording obtained at ankle and digital level. Findings: Right arm pressure is 133 mm parveen and on the left arm pressure is 129 mm Hg.  The ankle-brachial indexes are 0.55 monophasic wave form on the right and 0.74 with monophasic Doppler wave the left. The digital  brachial index is 0.26 on the right and 0.49 on the left. The pulse following recordings are diminished bilaterally. Impression:  Moderate peripheral disease bilateral lower extremities and low toe brachial  indices right leg worse than the left. Slight improvement in the ABIs compared to the prior ABIs of 2/22 with improved ABIs and toe brachial indices      IMPRESSION:  Jesus Alberto Martinez is a 72 year old male perpheral arterial disease, carotid disease     PLAN:   1 from the perpheral arterial disease standpoint overall patient doing well recommend continue conservative/medical management recommend lifestyle changes including cessation smoking, continue follow-up in 6 months repeat ABIs  2 patient had a left carotid endarterectomy with CABG in [facility] overall doing well does have left marginal mandibular nerve injury at this time per the patient seems to be doing well  Six months repeat carotid duplex ultrasound     normal...

## 2023-01-19 NOTE — ED ADULT TRIAGE NOTE - STATUS:
[facility] Medicine Wards Progress Note     Resident Team: [facility] Medicine List 1  Attending Physician: Evelyn Kc MD  Resident: Will   Intern: Hema        Subjective:      Brief HPI:  Hanh Hassan is a 29 y.o. male with no significant medical history who presented to [facility] ED on 1/18/2023  for blood work following recent ED visit in December.  In December, patient was seen in [facility] ED for sore throat, cough, and pain when swallowing solids and liquids. He was discharged and advised to get tested for HIV at the [facility] unit, which he was unable to do so. During this time, he started experiencing weight loss, decreased appetite, nausea, vomiting, fatigue, and night sweats.  Within the last 2 weeks, patient reports fevers, rhinorrhea, and cough productive of white sputum.  Patient denies hemoptysis, chest pain, palpitations, abdominal pain, shortness of breath.  He denies constipation or diarrhea however reports occasional mucus and bright red blood in stools.  He also reports recent bilateral lower extremity weakness requiring assistance ambulating.  Lab significant for leukopenia WBC 1.3, normocytic anemia, hyponatremia, hypophosphatemia, elevated ALP, and positive HIV.  Chest x-ray revealed patchy bilateral lung infiltrates, with dense consolidations in right middle lobe.  In the ED, he was given 1.5 L of normal saline, Zosyn, and Vancomycin.  Internal Medicine was consulted for new HIV diagnosis and pneumonia.    Interval History: No acute events overnight. Patient remained afebrile and blood pressure improved. Patient reports nausea and an episode of vomiting overnight. Otherwise, he feel well and has no complaints. Hgb 6.7 this morning, patient is asymptomatic. Will transfuse 2 u pRBCs. Denies chest pain, SOB, fever, chills, diarrhea, constipation.       Review of Systems:  ROS completed and negative except as indicated above.     Objective:     Last 24 Hour Vital Signs:  BP  Min: 100/72  Max: 139/75  Temp   Av.5 °F (36.4 °C)  Min: 97.2 °F (36.2 °C)  Max: 97.7 °F (36.5 °C)  Pulse  Av.8  Min: 63  Max: 141  Resp  Av.4  Min: 15  Max: 29  SpO2  Av.9 %  Min: 96 %  Max: 100 %  I/O last 3 completed shifts:  In: 1487.7 [I.V.:966.7; IV Piggyback:521]  Out: 350 [Urine:350]    Physical Examination:  General: Patient resting comfortably in bed, ill-appearing, cachectic   Eye: PERRLA, EOMI, clear conjunctiva, eyelids normal  HENT: Head-normocephalic and atraumatic. Oral well defined white plaques located on soft palate.   Neck: full range of motion, no thyromegaly or lymphadenopathy, trachea midline, supple, no palpable thyroid nodules  Respiratory: crackles in bilateral lung bases, without wheezing, rales, rhonchi   Cardiovascular: tachycardic and regular rhythm without murmurs.  No gallops or rubs no JVD.  Capillary refill within normal limits.  Gastrointestinal: soft, non-tender, non-distended with normal bowel sounds, without masses to palpation  Genitourinary: no CVA tenderness to palpation  Musculoskeletal: full range of motion of all extremities/spine without limitation or discomfort  Integumentary: hypopigmented patches located on face/scalp. Diffuse non-scarring hair loss.   Neurologic: no signs of peripheral neurological deficit, motor/sensory function intact  Psychiatric:  alert and oriented, cognitive function intact, cooperative with exam, good eye contact, judgement and insight altered, mood and affect flat.     Laboratory:  Most Recent Data:  CBC:   Lab Results   Component Value Date    WBC 1.1 (LL) 2023    HGB 6.7 (L) 2023    HCT 20.7 (L) 2023     2023    MCV 78.7 (L) 2023    RDW 16.1 2023     WBC Differential:   Recent Labs   Lab 23  1216 23  1804 23  0251   WBC 1.3* 1.1* 1.1*   HGB 8.3* 7.2* 6.7*   HCT 26.2* 22.9* 20.7*    220 192   MCV 80.4 80.4 78.7*     BMP:   Lab Results   Component Value Date     (L) 2023    K  3.1 (L) 01/19/2023    CO2 24 01/19/2023    BUN 8.2 (L) 01/19/2023    CREATININE 0.67 (L) 01/19/2023    CALCIUM 7.6 (L) 01/19/2023    MG 2.00 01/19/2023    PHOS 3.2 01/19/2023     LFTs:   Lab Results   Component Value Date    ALBUMIN 1.6 (L) 01/19/2023    BILITOT 0.4 01/19/2023    AST 21 01/19/2023    ALKPHOS 215 (H) 01/19/2023    ALT 13 01/19/2023     Coags: No results found for: INR, PROTIME, PTT  FLP:   Lab Results   Component Value Date    CHOL 56 01/19/2023    HDL 11 (L) 01/19/2023    TRIG 72 01/19/2023     DM:   Lab Results   Component Value Date    HGBA1C 5.5 01/18/2023    CREATININE 0.67 (L) 01/19/2023     Thyroid:   Lab Results   Component Value Date    TSH 1.435 01/18/2023      Anemia:   Lab Results   Component Value Date    IRON 7 (L) 01/18/2023    TIBC 125 (L) 01/18/2023    FERRITIN 2,325.58 (H) 01/18/2023     No results found for: SVYSZGSM04  No results found for: FOLATE     Cardiac:   Lab Results   Component Value Date    TROPONINI <0.010 01/18/2023    BNP 51.8 01/18/2023         Microbiology Data:  Microbiology Results (last 7 days)       Procedure Component Value Units Date/Time    Respiratory Culture [[id number]] Collected: 01/18/23 1608    Order Status: Completed Specimen: Sputum, Expectorated Updated: 01/19/23 0709     Respiratory Culture No Growth At 24 Hours    Urine culture [[id number]] Collected: 01/18/23 1232    Order Status: Completed Specimen: Urine Updated: 01/19/23 0641     Urine Culture No Growth At 24 Hours    Cryptococcal antigen, blood [[id number]] Collected: 01/18/23 1916    Order Status: Completed Specimen: Blood, Venous Updated: 01/18/23 1937     CRYPTOCOCCAL ANTIGEN, SERUM ([facility]) Negative     CRYPTOCOCCAL ANTIGEN TITER ([facility]) --    Blood Culture (site 1) [[id number]]     Order Status: Canceled Specimen: Blood     Blood Culture (site 2) [[id number]]     Order Status: Canceled Specimen: Blood     Stool Culture [[id number]]     Order Status: Sent Specimen: Stool     Blood Culture [[id number]]  Collected: 01/18/23 1425    Order Status: Resulted Specimen: Blood from Forearm, Right Updated: 01/18/23 1427    Blood Culture [[id number]] Collected: 01/18/23 1402    Order Status: Resulted Specimen: Blood from Arm, Right Updated: 01/18/23 1403             Other Results:  EKG (my interpretation): unchanged from previous tracings    Radiology:  Imaging Results              CT Abdomen Pelvis With Contrast (Final result)  Result time 01/18/23 14:34:11      Final result by Amairani Cruz MD (01/18/23 14:34:11)                   Impression:      1. Findings concerning for multifocal pneumonia in the lung bases.  2. No acute abnormality of the abdomen or pelvis.      Electronically signed by: Amairani Cruz  Date:    01/18/2023  Time:    14:34               Narrative:    EXAMINATION:  CT ABDOMEN PELVIS WITH CONTRAST    CLINICAL HISTORY:  Flank pain, kidney stone suspected;    TECHNIQUE:  CT imaging was performed of the abdomen and pelvis after the administration of intravenous contrast. Dose length product is 157 mGycm. Automatic exposure control, adjustment of mA/kV or iterative reconstruction technique was used to limit radiation dose.    COMPARISON:  None    FINDINGS:  Liver: Normal.    Gallbladder and biliary tree: No calcified gallstones. No intra or extrahepatic biliary ductal dilation.    Pancreas: Normal.    Spleen: Normal.    Adrenals: Normal.    Kidneys and ureters: There is no obstructing urinary calculus.  There is no hydronephrosis.    Bladder: Normal.    Reproductive organs: No pelvic masses.    Stomach/bowel: No evidence of bowel obstruction. The appendix is not clearly identified.  No discernible bowel inflammation.    Lymph nodes: No pathologically enlarged lymph node identified.    Peritoneum: No ascites or free air. No fluid collection.    Vessels: No abdominal aortic aneurysm.    Abdominal wall: Normal.    Lung bases: There are consolidative changes in the right lower and middle lobes with  additional scattered bibasilar airspace opacities.    Bones: No acute osseous findings.                                       X-Ray Chest PA And Lateral (Final result)  Result time 01/18/23 13:59:35      Final result by Tom Quezada MD (01/18/23 13:59:35)                   Impression:      Bilateral lungs infiltrates.      Electronically signed by: Tom Quezada  Date:    01/18/2023  Time:    13:59               Narrative:    EXAMINATION:  XR CHEST PA AND LATERAL    CLINICAL HISTORY:  SV;    TECHNIQUE:  Two views    COMPARISON:  December 6, 2022.    FINDINGS:  Cardiopericardial silhouette is within normal limits.  There is dense consolidation which involves the right middle lung lobe.  There are additional patchy infiltrates which involve the right lower lung lobe and to a lesser degree the left lower lung lobe.  No pulmonary edema or pneumothorax.  No fluid accumulation within the pleural spaces.                        ED Interpretation by Delfino Mishra MD (01/18/23 13:56:26, [facility], Emergency Medicine)    Signs of pneumonia in the right lower lobe.  No cardiomegaly or pneumothorax                                    Current Medications:     Infusions:       Scheduled:   enoxaparin  40 mg Subcutaneous Daily    nystatin  500,000 Units Oral QID    piperacillin-tazobactam (ZOSYN) IVPB  4.5 g Intravenous Q8H    trimethoprim-sulfamethoxasole (BACTRIM) IVPB (for non fluid restricted pts) (fixed ratio)  20 mg/kg/day Intravenous Q8H    vancomycin (VANCOCIN) IVPB  750 mg Intravenous Q12H        PRN:  sodium chloride, acetaminophen, dextrose 10%, dextrose 10%, glucagon (human recombinant), glucose, glucose, naloxone, ondansetron, sodium chloride 0.9%, Pharmacy to dose Vancomycin consult **AND** vancomycin - pharmacy to dose    Antibiotics and Day Number of Therapy:  Vanc, Zosyn day 1   Bactrim day 1    Lines and Day Number of Therapy:  PIV    Assessment & Plan:   HIV   Oral candidiasis    - HIV 1/2 Ag/Ab reactive   - CD4 count, HIV PCR, HIV 1/2 Antibody Diff pending  - PJP PCR, fungitell pending   - Hepatitis panel negative, Cryptococcus negative   - Syphilis Ab reactive, RPR reactive. Treated at 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   - Continue nystatin suspension 100 mg qid   - Will consider opportunistic infection prophylaxis pending CD4 count      Community Acquired PNA   Suspected PJP   Sepsis   - SIRS 3/4: leukopenia, tachycardia, fever with respiratory source   - Chest xray revealed patchy bilateral infiltrates with dense consolidation  in right middle lobe   - COVID/FLU PCR negative  - WBC 1.1 today   - Neutropenic precautions   - Continue broad spectrum abx with IV Zosyn and Vancomycin and Bactrim for suspected PJP infection   - Continue IVF with  mL/hr   - Blood cultures pending   - Respiratory cultures no growth at 24 hrs   - PJP PCR, Fungitell pending   - Ordered ABG to assess need for steroid tx         Hyponatremia   Hypophosphatemia   - Sodium 133 this AM   - Serum osmolality 272, urine osmolality wnl, urine Na <20  - Stool studies, Legionella,Giardia/Cryptosporidium pending    - Will continue to monitor and replete as necessary      Normocytic anemia   - H/H of 6.7/20.7 from 7.2/22.9  - Ordered FOBT given hx of hematochezia  - Transfusing 2 u pRBCs today, will f/u H/H   - Continue to monitor H/H for further drop and need for intervention    Malnutrition   - Consulted Nutrition  - Continue neutropenic diet and encourage oral intake   - Will order chocolate boost TID   - Consider megace to stimulate appetite       CODE STATUS: FULL   Access: PIV   Antibiotics: zosyn, vancomycin, bactrim   Diet: regular   DVT Prophylaxis: lovenox   GI Prophylaxis:   Fluids:  ml/hr       Disposition: Continue IV abx for CAP coverage and presumed PJP. Continue infectious disease workup for newly diagnosed HIV.             Ronel Garrido MD  [employer]  Applied [facility] HO-1

## 2023-09-28 NOTE — ED PROVIDER NOTE - CROS ED ROS STATEMENT
all other ROS negative except as per HPI Rinvoq Pregnancy And Lactation Text: Based on animal studies, Rinvoq may cause embryo-fetal harm when administered to pregnant women.  The medication should not be used in pregnancy.  Breastfeeding is not recommended during treatment and for 6 days after the last dose.

## 2024-09-09 ENCOUNTER — NON-APPOINTMENT (OUTPATIENT)
Age: 59
End: 2024-09-09

## 2024-09-09 ENCOUNTER — APPOINTMENT (OUTPATIENT)
Dept: CARDIOLOGY | Facility: CLINIC | Age: 59
End: 2024-09-09
Payer: MEDICAID

## 2024-09-09 VITALS
WEIGHT: 160 LBS | DIASTOLIC BLOOD PRESSURE: 66 MMHG | BODY MASS INDEX: 31.41 KG/M2 | TEMPERATURE: 97.2 F | HEIGHT: 60 IN | OXYGEN SATURATION: 98 % | SYSTOLIC BLOOD PRESSURE: 104 MMHG | HEART RATE: 68 BPM

## 2024-09-09 DIAGNOSIS — I45.10 UNSPECIFIED RIGHT BUNDLE-BRANCH BLOCK: ICD-10-CM

## 2024-09-09 PROCEDURE — 99203 OFFICE O/P NEW LOW 30 MIN: CPT | Mod: 25

## 2024-09-09 PROCEDURE — 93000 ELECTROCARDIOGRAM COMPLETE: CPT | Mod: NC

## 2024-09-11 ENCOUNTER — APPOINTMENT (OUTPATIENT)
Dept: CARDIOLOGY | Facility: CLINIC | Age: 59
End: 2024-09-11
Payer: MEDICAID

## 2024-09-11 DIAGNOSIS — R06.02 SHORTNESS OF BREATH: ICD-10-CM

## 2024-09-11 DIAGNOSIS — Z01.810 ENCOUNTER FOR PREPROCEDURAL CARDIOVASCULAR EXAMINATION: ICD-10-CM

## 2024-09-11 PROCEDURE — 93306 TTE W/DOPPLER COMPLETE: CPT

## 2024-09-11 NOTE — HISTORY OF PRESENT ILLNESS
[FreeTextEntry1] : Abimbola Ruiz is a 58- year- old female with history of Asthma and RBBB comes for pre- op cardiac evaluation for removal of breast implants. Denies any chest pains or palpitations. Mild shortness of breath on climbing stairs or walks a lot which is relieved with rest in few minutes. Taking allergy medications.

## 2024-09-11 NOTE — REVIEW OF SYSTEMS
[Dyspnea on exertion] : dyspnea during exertion [Negative] : Respiratory [Earache] : no earache [Chest Discomfort] : no chest discomfort [Lower Ext Edema] : no extremity edema [Palpitations] : no palpitations [Orthopnea] : no orthopnea [PND] : no PND [Abdominal Pain] : no abdominal pain [Nausea] : no nausea [Vomiting] : no vomiting [Heartburn] : no heartburn [Joint Pain] : no joint pain [Rash] : no rash [Itching] : no itching [Dizziness] : no dizziness [Tremor] : no tremor was seen [Tingling (Paresthesia)] : no tingling [Confusion] : no confusion was observed [Anxiety] : no anxiety [Under Stress] : not under stress [Easy Bleeding] : no tendency for easy bleeding [Easy Bruising] : no tendency for easy bruising

## 2024-09-11 NOTE — ADDENDUM
[FreeTextEntry1] : Echo done today 9/11/2024 showed normal LV systolic function. With good functional capacity and normal LV systolic function she will be low risk cardiac wise for breast implant extraction. Explained MsJaja Yunier in detail.

## 2024-09-11 NOTE — DISCUSSION/SUMMARY
[FreeTextEntry1] : In a summary Abimbola Ruiz is a middle- aged female with RBBB, shortness of breath and Pre- op will get Echo to assess LV systolic function. Further plan based on Echo results. Explained Ms. Faye.

## 2024-09-11 NOTE — REVIEW OF SYSTEMS
Referred to ENT for ear pain.  Please schedule with first available provider. Thanks   [Dyspnea on exertion] : dyspnea during exertion [Negative] : Respiratory [Earache] : no earache [Chest Discomfort] : no chest discomfort [Lower Ext Edema] : no extremity edema [Palpitations] : no palpitations [Orthopnea] : no orthopnea [PND] : no PND [Abdominal Pain] : no abdominal pain [Nausea] : no nausea [Vomiting] : no vomiting [Heartburn] : no heartburn [Joint Pain] : no joint pain [Rash] : no rash [Itching] : no itching [Dizziness] : no dizziness [Tremor] : no tremor was seen [Tingling (Paresthesia)] : no tingling [Confusion] : no confusion was observed [Anxiety] : no anxiety [Under Stress] : not under stress [Easy Bleeding] : no tendency for easy bleeding [Easy Bruising] : no tendency for easy bruising

## 2025-03-17 NOTE — ED ADULT NURSE REASSESSMENT NOTE - PAIN REST
Detail Level: Detailed
Quality 47: Advance Care Plan: Advance Care Planning discussed and documented; advance care plan or surrogate decision maker documented in the medical record.
Quality 226: Preventive Care And Screening: Tobacco Use: Screening And Cessation Intervention: Patient screened for tobacco use and is an ex/non-smoker
0

## 2025-05-07 NOTE — ED PROVIDER NOTE - MUSCULOSKELETAL, MLM
[Video Captured] : video captured and filed [de-identified] : -   Pre-operative Diagnosis: Dysphonia Post-operative Diagnosis: laryngopharyngeal reflux Anesthesia: Topical - 1 % Lidocaine/Phenylephrine Procedure: Flexible Laryngoscopy with Stroboscopy - [facility] [id number]   Procedure Details: The patient was placed in the sitting position. After decongestant and anesthesia were applied the laryngoscope was passed. The nasal cavities, nasopharynx, oropharynx, hypopharynx, and larynx were all examined. Vocal folds were examined during respiration and phonation. The following findings were noted:   Findings: Nose: Septum is midline, turbinates are normal, nasal airways patent, mucosa normal Nasopharynx: Adenoids normal, no masses, eustachian tube normal Oropharynx: Pharyngeal walls symmetric and without lesion. Tonsils/fossae symmetric Hypopharynx: Hypopharynx and pyriform sinuses without lesion. No masses or asymmetry. + pooling of secretions. Larynx: Epiglottis and aryepiglottic folds were sharp and crisp bilaterally. Bilateral false vocal folds normal appearance. Airway was widely patent. + postcricoid edema, erythema of the vocal process   Strobe Exam Ratings   TVF Appearance: normal, mild erythema of the vocal process TVF Mobility: normal Edema/hypertrophy: normal, + postcricoid edema Mucus on TVF: normal Glottic Closure: normal/adequate Mucosal Wave: normal Amplitude of Vibration: normal Phase: symmetric Supraglottic Hyperfunction: none Other Findings: none   Condition: Stable. Patient tolerated procedure well.   Complications: None  ---------------------    ----------------------------------------------------------------------------------------------------- PREOPERATIVE DIAGNOSIS: neurogenic cough   POSTOPERATIVE DIAGNOSIS: Same as above   NAME OF PROCEDURE: LEFT Superior Laryngeal Nerve Injection - 66573   SURGEON: Bruce Delcid MD   ASSISTANTS: none   ANESTHESIA: none   ESTIMATED BLOOD LOSS: < 1 cc   SPECIMENS: None   COMPLICATIONS: None   INDICATIONS FOR SURGERY: This is a patient with throat discomfort and chronic cough with presumed neurogenic cough. The decision was made to proceed with a superior laryngeal nerve injection. The patient understands the risks, benefits, and alternatives of the surgery including possibility of worsened voice, pain, discomfort, bleeding, need for further surgery, difficulty swallowing, and wished to go ahead with the operation as planned. I spoke with the patient and all of their questions were answered to their satisfaction and they asked me to perform this procedure.   DETAILS OF THE PROCEDURE: The patient was brought to the procedure room and I identified them. A formal timeout was called.   The neck was wiped clean with an alcohol wipe.  Next I palpated landmarks including the hyoid, and thryoid cartilage.  Once the thyrohyoid membrane was identified I palpated the carotid on the LEFT side and protected this with my finger.  Next using a 25 gauge needle I injected 2cc (50:50 Kenalog 40 and 2% bupivacaine) 1cm anterior and 1cm deep to the carotid and into the thyroid membrane. The injector was then withdrawn.  No bleeding was identified.  The patient was observed for 15 minutes without complication.    Complications: none Disposition: home Spine appears normal, range of motion is not limited, no muscle or joint tenderness

## 2025-06-09 NOTE — ED ADULT NURSE NOTE - [id number]
No